# Patient Record
Sex: MALE | Race: WHITE | Employment: FULL TIME | URBAN - METROPOLITAN AREA
[De-identification: names, ages, dates, MRNs, and addresses within clinical notes are randomized per-mention and may not be internally consistent; named-entity substitution may affect disease eponyms.]

---

## 2017-01-04 ENCOUNTER — OFFICE VISIT (OUTPATIENT)
Dept: INTERNAL MEDICINE CLINIC | Facility: CLINIC | Age: 22
End: 2017-01-04

## 2017-01-04 ENCOUNTER — HOSPITAL ENCOUNTER (OUTPATIENT)
Dept: ULTRASOUND IMAGING | Facility: HOSPITAL | Age: 22
Discharge: HOME | End: 2017-01-04
Attending: INTERNAL MEDICINE

## 2017-01-04 VITALS
TEMPERATURE: 98 F | BODY MASS INDEX: 21.98 KG/M2 | HEART RATE: 76 BPM | HEIGHT: 68 IN | WEIGHT: 145 LBS | SYSTOLIC BLOOD PRESSURE: 120 MMHG | DIASTOLIC BLOOD PRESSURE: 76 MMHG

## 2017-01-04 DIAGNOSIS — M79.89 RIGHT LEG SWELLING: Primary | ICD-10-CM

## 2017-01-04 DIAGNOSIS — S83.206S: ICD-10-CM

## 2017-01-04 DIAGNOSIS — M79.89 RIGHT LEG SWELLING: ICD-10-CM

## 2017-01-04 PROCEDURE — 99202 OFFICE O/P NEW SF 15 MIN: CPT | Performed by: INTERNAL MEDICINE

## 2017-01-04 PROCEDURE — 99203 OFFICE O/P NEW LOW 30 MIN: CPT | Performed by: INTERNAL MEDICINE

## 2017-01-04 PROCEDURE — 93971 EXTREMITY STUDY: CPT

## 2017-01-04 NOTE — PROGRESS NOTES
Aquiles Shoulders is a 22year old male.     HPI:   Patient presents with:  Checkup: had miniscus surgery right leg , now pain in calf       Jamaica Range is 23 y/o M who plays baseball at [employer] as sophomore; he injured right knee and had meniscal arthroscopic gait disturbance; negative for paresthesias   All other review of systems are negative. PHYSICAL EXAM:   Blood pressure 120/76, pulse 76, temperature 97.6 °F (36.4 °C), temperature source Oral, height 5' 8\" (1.727 m), weight 145 lb (65.772 kg).   Co

## 2017-01-05 ENCOUNTER — TELEPHONE (OUTPATIENT)
Dept: INTERNAL MEDICINE CLINIC | Facility: CLINIC | Age: 22
End: 2017-01-05

## 2017-01-05 ENCOUNTER — HOSPITAL ENCOUNTER (OUTPATIENT)
Dept: ULTRASOUND IMAGING | Facility: HOSPITAL | Age: 22
Discharge: HOME | End: 2017-01-05
Attending: INTERNAL MEDICINE

## 2017-01-05 DIAGNOSIS — M79.89 RIGHT LEG SWELLING: ICD-10-CM

## 2017-01-05 DIAGNOSIS — R53.83 FATIGUE, UNSPECIFIED TYPE: Primary | ICD-10-CM

## 2017-01-05 DIAGNOSIS — M79.89 RIGHT LEG SWELLING: Primary | ICD-10-CM

## 2017-01-05 PROCEDURE — 85025 COMPLETE CBC W/AUTO DIFF WBC: CPT

## 2017-01-05 PROCEDURE — 93971 EXTREMITY STUDY: CPT

## 2017-01-05 PROCEDURE — 36415 COLL VENOUS BLD VENIPUNCTURE: CPT

## 2017-01-05 NOTE — TELEPHONE ENCOUNTER
RLE pain and swelling has worsened c/w yesterday; pt will undergo MRI RLE by Dr Nicole Jackson today; will obtain repeat venous doppler R/O DVT today; d/w Dr Nicole Jackson

## 2017-01-24 ENCOUNTER — TELEPHONE (OUTPATIENT)
Dept: INTERNAL MEDICINE CLINIC | Facility: CLINIC | Age: 22
End: 2017-01-24

## 2017-01-24 DIAGNOSIS — B00.1 RECURRENT COLD SORES: Primary | ICD-10-CM

## 2017-01-24 NOTE — TELEPHONE ENCOUNTER
Dr. Rene Flaherty, please advise on dermatologist, referral pended, but will need to add provider name and dx.

## 2017-01-24 NOTE — TELEPHONE ENCOUNTER
Pt needs a written referral for a Dermatologist that Dr Pau Wheeler recommends, [facility] call mother Marilia Elizabeth when complete [phone]    Tasked to nursing

## 2017-01-26 NOTE — TELEPHONE ENCOUNTER
Called patients mother who states patient has recurrent cold sores - referral completed and sent to [facility]

## 2017-01-26 NOTE — TELEPHONE ENCOUNTER
Advise referral to dermatologist Dr Abdi Menjivar at [phone] at [facility] in [place]; please also inquire what condition is needed to put on referral form; please call pt/mother

## 2017-08-16 ENCOUNTER — OFFICE VISIT (OUTPATIENT)
Dept: INTERNAL MEDICINE CLINIC | Facility: CLINIC | Age: 22
End: 2017-08-16

## 2017-08-16 VITALS
SYSTOLIC BLOOD PRESSURE: 120 MMHG | DIASTOLIC BLOOD PRESSURE: 76 MMHG | HEART RATE: 72 BPM | BODY MASS INDEX: 22.71 KG/M2 | TEMPERATURE: 98 F | HEIGHT: 67.5 IN | WEIGHT: 146.38 LBS

## 2017-08-16 DIAGNOSIS — Z00.00 PHYSICAL EXAM, ANNUAL: Primary | ICD-10-CM

## 2017-08-16 DIAGNOSIS — D22.9 ATYPICAL NEVUS: ICD-10-CM

## 2017-08-16 DIAGNOSIS — B00.1 RECURRENT COLD SORES: ICD-10-CM

## 2017-08-16 PROCEDURE — G0438 PPPS, INITIAL VISIT: HCPCS | Performed by: INTERNAL MEDICINE

## 2017-08-16 PROCEDURE — 99395 PREV VISIT EST AGE 18-39: CPT | Performed by: INTERNAL MEDICINE

## 2017-08-16 RX ORDER — ACYCLOVIR 400 MG/1
400 TABLET ORAL 3 TIMES DAILY
Qty: 21 TABLET | Refills: 5 | Status: SHIPPED | OUTPATIENT
Start: 2017-08-16 | End: 2018-05-29

## 2017-08-17 NOTE — PROGRESS NOTES
Merribeth Favre is a 22year old male.     HPI:   Patient presents with:  Mouth Cold Sores (respiratory/integumentary): has cold sores around mouth ( going back to college)      26 y/o M here for physical exam; at [employer] as abe; he injured right k Negative for pruritus and rash  Neurological:  Negative for gait disturbance; negative for paresthesias   All other review of systems are negative.         PHYSICAL EXAM:   Blood pressure 120/76, pulse 72, temperature 98.2 °F (36.8 °C), temperature source O

## 2018-05-31 RX ORDER — ACYCLOVIR 400 MG/1
TABLET ORAL
Qty: 21 TABLET | Refills: 5 | Status: SHIPPED | OUTPATIENT
Start: 2018-05-31

## 2019-06-25 ENCOUNTER — HOSPITAL ENCOUNTER (OUTPATIENT)
Age: 24
Discharge: HOME | End: 2019-06-25
Attending: EMERGENCY MEDICINE
Payer: COMMERCIAL

## 2019-06-25 VITALS
OXYGEN SATURATION: 98 % | DIASTOLIC BLOOD PRESSURE: 67 MMHG | TEMPERATURE: 98 F | SYSTOLIC BLOOD PRESSURE: 142 MMHG | RESPIRATION RATE: 18 BRPM | HEART RATE: 66 BPM

## 2019-06-25 DIAGNOSIS — S61.259A DOG BITE OF FINGER, INITIAL ENCOUNTER: Primary | ICD-10-CM
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
## 2019-06-25 PROCEDURE — 99204 OFFICE O/P NEW MOD 45 MIN: CPT

## 2019-06-25 PROCEDURE — 99213 OFFICE O/P EST LOW 20 MIN: CPT

## 2019-06-25 RX ORDER — AMOXICILLIN AND CLAVULANATE POTASSIUM 875; 125 MG/1; MG/1
1 TABLET, FILM COATED ORAL 2 TIMES DAILY
Qty: 20 TABLET | Refills: 0 | Status: SHIPPED | OUTPATIENT
Start: 2019-06-25 | End: 2019-07-05

## 2019-06-26 NOTE — ED INITIAL ASSESSMENT (HPI)
Bite to L index finger from neighbors small dog. Dog up to date with vaccines. Pt up to date with tetanus.

## 2019-06-26 NOTE — ED PROVIDER NOTES
Patient Seen in: [facility] [address] [place] [address]    History   Patient presents with:  Laceration Abrasion (integumentary)    Stated Complaint: dog bite    HPI    Patient complains of dog bite to his left index finger.   This happened right pr EYES: PERRLA, EOMI, conj sclera clear  THROAT: mmm, no lesions  NECK: supple, no meningeal signs  LUNGS: no resp distress, cta bilateral  CARDIO: RRR without murmur  GI: abdomen is soft and non tender, no masses, nl bowel sounds   EXTREMITIES: from, 5/5

## (undated) NOTE — MR AVS SNAPSHOT
[facility] [place]  [address] [place] [address]  [phone]               Thank you for choosing us for your health care visit with Raeann Mijares MD.  We are glad to serve you and happy to provide you with this summary of your visit.   [address]  [address] [place], [place]    [address]  [address] [address] [place], [place]    It is the patient's responsibility to check with and follow their insurance company's guidelines for prior authorization